# Patient Record
Sex: MALE | Race: WHITE
[De-identification: names, ages, dates, MRNs, and addresses within clinical notes are randomized per-mention and may not be internally consistent; named-entity substitution may affect disease eponyms.]

---

## 2019-11-27 NOTE — REP
Clinical:  Headache and dizziness .

 

Comparison: None .

 

Findings:

The ventricles, sulci, and cisterns are normal in position and appearance.

Gray-white differentiation is maintained.  No acute intracranial hemorrhage,

mass/mass effect, pathology or trauma/injury.  No evidence for acute infarction.

No extra-axial fluid collection.  Calvarium is intact.  Paranasal sinuses and

mastoid air cells are clear.

 

Impression:

Normal noncontrast head CT.

No evidence for acute intracranial pathology or trauma/injury.

 

 

Electronically Signed by

Nikolas Ortega MD 11/27/2019 09:57 A

## 2019-11-27 NOTE — ECGEPIP
[facility] - ED

                                       

                                       Test Date:    2019

Pat Name:     VENANCIO VELASQUEZ           Department:   

Patient ID:   [id number]                 Room:         -

Gender:       Male                     Technician:   HANNA

:          1970               Requested By: KARTHIK ROTHMAN

Order Number: [id number]     Reading MD:   Mike Norton

                                 Measurements

Intervals                              Axis          

Rate:         60                       P:            67

LA:           153                      QRS:          64

QRSD:         93                       T:            55

QT:           376                                    

QTc:          378                                    

                           Interpretive Statements

SINUS RHYTHM

INCOMPLETE RIGHT BUNDLE BRANCH BLOCK

NO PRIORS FOR COMPARISON

Electronically Signed on 2019 9:02:33 EST by Mike Norton

## 2022-04-19 ENCOUNTER — HOSPITAL ENCOUNTER (OUTPATIENT)
Dept: HOSPITAL 53 - M SFHCDERM | Age: 52
End: 2022-04-19
Attending: NURSE PRACTITIONER
Payer: COMMERCIAL

## 2022-04-19 DIAGNOSIS — L82.1: Primary | ICD-10-CM

## 2022-05-03 ENCOUNTER — HOSPITAL ENCOUNTER (OUTPATIENT)
Dept: HOSPITAL 53 - M LAB REF | Age: 52
End: 2022-05-03
Attending: NURSE PRACTITIONER
Payer: COMMERCIAL

## 2022-05-03 DIAGNOSIS — D22.21: Primary | ICD-10-CM

## 2022-09-14 ENCOUNTER — HOSPITAL ENCOUNTER (OUTPATIENT)
Dept: HOSPITAL 53 - M LAB REF | Age: 52
End: 2022-09-14
Attending: PHYSICIAN ASSISTANT
Payer: COMMERCIAL

## 2022-09-14 DIAGNOSIS — J06.9: Primary | ICD-10-CM

## 2022-09-14 DIAGNOSIS — J02.9: ICD-10-CM
